# Patient Record
Sex: MALE | Race: BLACK OR AFRICAN AMERICAN | NOT HISPANIC OR LATINO | URBAN - METROPOLITAN AREA
[De-identification: names, ages, dates, MRNs, and addresses within clinical notes are randomized per-mention and may not be internally consistent; named-entity substitution may affect disease eponyms.]

---

## 2018-12-06 ENCOUNTER — EMERGENCY (EMERGENCY)
Facility: HOSPITAL | Age: 23
LOS: 0 days | Discharge: HOME | End: 2018-12-06
Attending: EMERGENCY MEDICINE
Payer: SELF-PAY

## 2018-12-06 VITALS
DIASTOLIC BLOOD PRESSURE: 86 MMHG | HEIGHT: 68 IN | RESPIRATION RATE: 17 BRPM | SYSTOLIC BLOOD PRESSURE: 136 MMHG | WEIGHT: 169.98 LBS | HEART RATE: 87 BPM | TEMPERATURE: 100 F

## 2018-12-06 DIAGNOSIS — K04.7 PERIAPICAL ABSCESS WITHOUT SINUS: ICD-10-CM

## 2018-12-06 DIAGNOSIS — K05.219 AGGRESSIVE PERIODONTITIS, LOCALIZED, UNSPECIFIED SEVERITY: ICD-10-CM

## 2018-12-06 PROCEDURE — 99284 EMERGENCY DEPT VISIT MOD MDM: CPT

## 2018-12-06 RX ADMIN — Medication 900 MILLIGRAM(S): at 13:15

## 2018-12-06 RX ADMIN — Medication 100 MILLIGRAM(S): at 12:35

## 2018-12-06 NOTE — ED PROVIDER NOTE - ENMT, MLM
Airway patent, Nasal mucosa clear. Mouth with normal mucosa. Throat has no vesicles, no oropharyngeal exudates and uvula is midline. left lower molar mild swelling and erythema, left buccal mild swelling

## 2018-12-06 NOTE — ED PROVIDER NOTE - NSFOLLOWUPCLINICS_GEN_ALL_ED_FT
[facility]  Internal Medicine  [phone] [place] [address]  Phone: [phone]  Fax:   Follow Up Time:

## 2018-12-06 NOTE — ED ADULT NURSE NOTE - OBJECTIVE STATEMENT
Patient complains of left side face swelling. patient went to urgent care yesterday and was started on amoxicillin. patient denies fever/chills.

## 2018-12-06 NOTE — ED ADULT TRIAGE NOTE - CHIEF COMPLAINT QUOTE
pt states " I have been having left lower tooth pain  for the past 2 days. I was seen by urgent care yesterday and was given abx amoxicillin. however, Today I feel like the medication isn't working and my face on the left side is getting bigger." upon assessment there is an abscess.

## 2018-12-06 NOTE — ED PROVIDER NOTE - OBJECTIVE STATEMENT
Patient has had left lower tooth pain x several days with swelling x 2 days; patient took 2 doses of abx from urgent care center however no improvement.

## 2018-12-06 NOTE — ED PROVIDER NOTE - MEDICAL DECISION MAKING DETAILS
Symptoms mildly improved with abx, patient feels comfortable going home and following up out patient; patient advised to continue abx and analgesics; patient aware of signs/symptoms to return to ED
